# Patient Record
(demographics unavailable — no encounter records)

---

## 2025-04-17 NOTE — DISCUSSION/SUMMARY
[FreeTextEntry1] : The patient is a 21-year-old anxious female ADHD with recent HTN and palpitations. #1 CV- ECHO and exercise stress test #2 HTN- 24 hour BP monitor, consider secondary causes if positive.  #3 Psych- on guanfacine 2mg, venlafaxine 150mg,  [EKG obtained to assist in diagnosis and management of assessed problem(s)] : EKG obtained to assist in diagnosis and management of assessed problem(s)

## 2025-04-17 NOTE — HISTORY OF PRESENT ILLNESS
[FreeTextEntry1] : Flora is a 21-year-old female HTN who presents for evaluation. She is anxious and ADHD. She denies any FH